# Patient Record
(demographics unavailable — no encounter records)

---

## 2024-11-05 NOTE — ASSESSMENT
[FreeTextEntry1] : 12 mo girl with left lateral eyebrow laceration now POD#7 s/p ER repair with Dr. Tellez. Doing very well.   - stop Bacitracin - daily Aquaphor when baby is asleep - may start kids Mederma next week - all mom's questions were answered - f/u PRN

## 2024-11-05 NOTE — PHYSICAL EXAM
[de-identified] : well-developed and appropriate for age, NAD [de-identified] : NC/AT [de-identified] : CARLOS ENRIQUER [de-identified] : soft, non-tender, [de-identified] : Face - left lateral upper eyebrow with well-healed 1 cm laceration, no erythema, no cellulitis, excellent cosmesis

## 2024-11-05 NOTE — HISTORY OF PRESENT ILLNESS
[FreeTextEntry1] : 12 mo otherwise healthy baby girl with no significant PMHx who present today for wound check after repair of left eyebrow laceration. Per mom, baby fell from sitting position at home on 10/29 and likely hit her face on a toy sustaining left lateral eyebrow laceration which was repair primarily by Dr. Tellez 1 week ago. She did fine with no significant discomfort, f/c or bleeding. Last stitch fell out over the weekend. Mom is applying Bacitracin once a day.   Mom is a PA